# Patient Record
Sex: MALE | Race: WHITE | NOT HISPANIC OR LATINO | Employment: STUDENT | URBAN - METROPOLITAN AREA
[De-identification: names, ages, dates, MRNs, and addresses within clinical notes are randomized per-mention and may not be internally consistent; named-entity substitution may affect disease eponyms.]

---

## 2021-02-06 ENCOUNTER — OFFICE VISIT (OUTPATIENT)
Dept: URGENT CARE | Facility: CLINIC | Age: 21
End: 2021-02-06
Payer: COMMERCIAL

## 2021-02-06 VITALS — TEMPERATURE: 98.4 F | OXYGEN SATURATION: 99 % | HEART RATE: 111 BPM | RESPIRATION RATE: 16 BRPM

## 2021-02-06 DIAGNOSIS — Z11.59 SPECIAL SCREENING EXAMINATION FOR UNSPECIFIED VIRAL DISEASE: Primary | ICD-10-CM

## 2021-02-06 DIAGNOSIS — R50.9 FEVER, UNSPECIFIED FEVER CAUSE: ICD-10-CM

## 2021-02-06 DIAGNOSIS — B34.9 ACUTE VIRAL SYNDROME: ICD-10-CM

## 2021-02-06 PROCEDURE — 87635 SARS-COV-2 COVID-19 AMP PRB: CPT | Performed by: PHYSICIAN ASSISTANT

## 2021-02-06 PROCEDURE — G0382 LEV 3 HOSP TYPE B ED VISIT: HCPCS | Performed by: PHYSICIAN ASSISTANT

## 2021-02-06 NOTE — PROGRESS NOTES
[address] [facility]        NAME: Rolan Eden is a 24 y o  male  : 2000    MRN: [id number]  DATE: 2021  TIME: 4:49 AM    Assessment and Plan   Special screening examination for unspecified viral disease [Z11 59]  1  Special screening examination for unspecified viral disease  Novel Coronavirus (Covid-19),PCR [facility] - Office Collection   2  Fever, unspecified fever cause     3  Acute viral syndrome           Patient Instructions       Follow up with PCP in 3-5 days  Proceed to  ER if symptoms worsen  Chief Complaint     Chief Complaint   Patient presents with    Fever     Peak 102 0, "dry throat" (not painful to swallow)  Denies n/v/d, c/s, loss of taste or smell, loss of appetite, sob   s/s began 21  pt was positive for Covid - 19 20  Pt taking tylenol at home, last dose:  8pm 21         History of Present Illness         23-year-old male presents the clinic with fevers and dry throat that started 2 days ago  Pt is a student at [employer] and states that he moved in to his dorm room last week  Patient states that he has not been in any known contact with anyone suspected or known to have COVID-19  Patient did have positive COVID-19 test on 2020  Patient states that he was tested after he finishes quarantine and was then negative  Patient denies any nausea, vomiting, diarrhea, cough, chest tightness, shortness of breath, loss of taste or smell, loss of appetite, shortness breaths, fatigue, nasal congestion, rhinorrhea, sore throat  Patient states that he feels that his throat is dry and feels like he cannot get enough moisture even if he is drinking plenty of fluids  Patient states he has been drinking more than usual without any improvement in the dry throat  Patient denies any pain with swallowing, scratchy or irritated throat, difficulty speaking, eating or drinking    Patient states that this does not feel anything like his symptoms when he had COVID-19 in November  Patient states that since he is of student at [employer] he was sent to urgent care for a COVID test since they could not do a rapid test on campus today  Patient has been taking Tylenol for symptom relief and his last dose was yesterday at 8:00 p m  Jeremias Womack Patient states that his T-max yesterday was 80, patient denies any fevers today  Review of Systems   Review of Systems   Constitutional: Positive for fever  Negative for appetite change, chills and fatigue  HENT: Positive for sore throat (Dry throat, no pain)  Negative for congestion, ear pain, postnasal drip and rhinorrhea  Negative loss of sense of taste or smell   Eyes: Negative for photophobia, pain, discharge and visual disturbance  Respiratory: Negative for cough, chest tightness, shortness of breath and wheezing  Cardiovascular: Negative for chest pain and palpitations  Gastrointestinal: Negative for abdominal pain, diarrhea, nausea and vomiting  Musculoskeletal: Negative for myalgias  Skin: Negative for rash  Neurological: Negative for dizziness, light-headedness and headaches  Current Medications     No current outpatient medications on file  Current Allergies     Allergies as of 02/06/2021    (No Known Allergies)            The following portions of the patient's history were reviewed and updated as appropriate: allergies, current medications, past family history, past medical history, past social history, past surgical history and problem list      Past Medical History:   Diagnosis Date    COVID-19        Past Surgical History:   Procedure Laterality Date    ADENOIDECTOMY         History reviewed  No pertinent family history  Medications have been verified  Objective   Pulse (!) 111   Temp 98 4 °F (36 9 °C)   Resp 16   SpO2 99%   No LMP for male patient  Physical Exam     Physical Exam  Vitals signs and nursing note reviewed     Constitutional:       General: He is not in acute distress  Appearance: He is well-developed  He is not diaphoretic  HENT:      Head: Normocephalic and atraumatic  Nose: Nose normal       Mouth/Throat:      Lips: Pink  Mouth: Mucous membranes are moist       Pharynx: Oropharynx is clear  Uvula midline  No pharyngeal swelling, oropharyngeal exudate, posterior oropharyngeal erythema or uvula swelling  Comments:   No rapid strep was done since patient is denying any sore throat pain  Eyes:      Conjunctiva/sclera: Conjunctivae normal    Cardiovascular:      Rate and Rhythm: Normal rate and regular rhythm  Heart sounds: Normal heart sounds  Pulmonary:      Effort: Pulmonary effort is normal       Breath sounds: Normal breath sounds  Musculoskeletal: Normal range of motion  Skin:     General: Skin is warm and dry  Neurological:      Mental Status: He is alert and oriented to person, place, and time

## 2021-02-06 NOTE — PATIENT INSTRUCTIONS
Fever in Adults   AMBULATORY CARE:   A fever  is an increase in your body temperature  Normal body temperature is 98 6°F (37°C)  Fever is generally defined as greater than 100 4°F (38°C)  Common causes include an infection, injury, or disease such as arthritis  Other signs and symptoms may include any of the following:   · Chills and shivers     · Muscle stiffness    · Weight loss    · Night sweats    · Fever that comes and goes    · Fever that is higher in the morning    Seek care immediately if:   · Your fever does not go away or gets worse even after treatment  · You have a stiff neck and a bad headache  · You are confused  You may not be able to think clearly or remember things like you normally do  · Your heart beats faster than usual even after treatment  · You have shortness of breath or chest pain when you breathe  · You urinate small amounts or not at all  · Your skin, lips, or nails turn blue  Contact your healthcare provider if:   · You have abdominal pain or you feel bloated  · You have nausea or are vomiting  · You have pain or burning when you urinate, or you have pain in your back  · You have questions or concerns about your condition or care  Treatment for a fever  may include any of the following:  · NSAIDs , such as ibuprofen, help decrease swelling, pain, and fever  This medicine is available with or without a doctor's order  NSAIDs can cause stomach bleeding or kidney problems in certain people  If you take blood thinner medicine, always ask if NSAIDs are safe for you  Always read the medicine label and follow directions  Do not give these medicines to children under 10months of age without direction from your child's healthcare provider  · Acetaminophen  decreases pain and fever  It is available without a doctor's order  Ask how much to take and how often to take it  Follow directions   Read the labels of all other medicines you are using to see if they also contain acetaminophen, or ask your doctor or pharmacist  Acetaminophen can cause liver damage if not taken correctly  Do not use more than 4 grams (4,000 milligrams) total of acetaminophen in one day  · Antibiotics  may be given if you have an infection caused by bacteria  · Take your medicine as directed  Contact your healthcare provider if you think your medicine is not helping or if you have side effects  Tell him of her if you are allergic to any medicine  Keep a list of the medicines, vitamins, and herbs you take  Include the amounts, and when and why you take them  Bring the list or the pill bottles to follow-up visits  Carry your medicine list with you in case of an emergency  Self-care:   · Drink more liquids as directed  A fever makes you sweat  This can increase your risk for dehydration  Liquids can help prevent dehydration  ? Drink at least 6 to 8 eight-ounce cups of clear liquids each day  Drink water, juice, or broth  Do not drink sports drinks  They may contain caffeine  ? Ask your healthcare provider if you should drink an oral rehydration solution (ORS)  An ORS has the right amounts of water, salts, and sugar you need to replace body fluids  · Dress in lightweight clothes  Shivers may be a sign that your fever is rising  Do not put extra blankets or clothes on  This may cause your fever to rise even higher  Dress in light, comfortable clothing  Use a lightweight blanket or sheet when you sleep  Change your clothes, blanket, or sheets if they get wet  · Cool yourself safely  Take a bath in cool or lukewarm water  Use an ice pack wrapped in a small towel or wet a washcloth with cool water  Place the ice pack or wet washcloth on your forehead or the back of your neck  Follow up with your healthcare provider as directed:  Write down your questions so you remember to ask them during your visits     © Copyright [employer] 2020 Information is for End User's use only and may not be sold, redistributed or otherwise used for commercial purposes  All illustrations and images included in CareNotes® are the copyrighted property of A D A M , Inc  or Surinder Whittaker  The above information is an  only  It is not intended as medical advice for individual conditions or treatments  Talk to your doctor, nurse or pharmacist before following any medical regimen to see if it is safe and effective for you  Viral Syndrome, Ambulatory Care   GENERAL INFORMATION:   Viral syndrome  is a term healthcare providers use for general symptoms of a viral infection that has no clear cause  Common symptoms include the following:   · Fever and chills, or a rash    · Runny or stuffy nose     · Cough, sore throat, or hoarseness     · Headache, or pain and pressure around your eyes     · Muscle aches and joint pain     · Shortness of breath or wheezing     · Abdominal pain, cramps, and diarrhea     · Nausea, vomiting, or loss of appetite  Seek immediate care for the following symptoms:   · Continued vomiting and diarrhea    · Chest pain or trouble breathing  Treatment for a viral syndrome  may include medicines to decrease fever, cough, or stuffy nose  You may also need medicines to relieve a rash, itching, or help treat the viral infection  Manage your symptoms:  Drink liquids as directed to help prevent dehydration  Ask how much liquid to drink each day and which liquids are best for you  You may need to drink an oral rehydration solution (ORS)  An ORS has the right amounts of water, salts, and sugar you need to replace body fluids  Prevent the spread of viral syndrome:   · Wash your hands often  Use soap and water  Wash your hands after you use the bathroom, change a child's diapers, or sneeze  Wash your hands before you prepare or eat food  Use a gel hand  if soap and water are not available  · Wear a mask  to help prevent spreading the virus to others       · Cook and handle food properly  Cook food completely through  Clean food preparation surfaces with a disinfectant  · Ask about vaccinations  You may need an influenza (flu) vaccine, pneumococcal vaccine, or meningococcal vaccine  These vaccines help prevent the flu, pneumonia, and meningococcal disease  Follow up with your healthcare provider as directed:  Write down your questions so you remember to ask them during your visits  CARE AGREEMENT:   You have the right to help plan your care  Learn about your health condition and how it may be treated  Discuss treatment options with your caregivers to decide what care you want to receive  You always have the right to refuse treatment  The above information is an  only  It is not intended as medical advice for individual conditions or treatments  Talk to your doctor, nurse or pharmacist before following any medical regimen to see if it is safe and effective for you  © 2014 [address] is for End User's use only and may not be sold, redistributed or otherwise used for commercial purposes  All illustrations and images included in CareNotes® are the copyrighted property of A BHAVESH A [employer]ANT[employer] , Inc  or Pierce Villanueva

## 2021-02-07 LAB — SARS-COV-2 RNA RESP QL NAA+PROBE: NEGATIVE

## 2021-02-10 ENCOUNTER — OFFICE VISIT (OUTPATIENT)
Dept: URGENT CARE | Facility: CLINIC | Age: 21
End: 2021-02-10
Payer: COMMERCIAL

## 2021-02-10 VITALS
DIASTOLIC BLOOD PRESSURE: 76 MMHG | TEMPERATURE: 97.5 F | OXYGEN SATURATION: 95 % | BODY MASS INDEX: 25.73 KG/M2 | WEIGHT: 190 LBS | HEART RATE: 100 BPM | SYSTOLIC BLOOD PRESSURE: 134 MMHG | HEIGHT: 72 IN | RESPIRATION RATE: 18 BRPM

## 2021-02-10 DIAGNOSIS — R21 RASH: Primary | ICD-10-CM

## 2021-02-10 PROCEDURE — G0382 LEV 3 HOSP TYPE B ED VISIT: HCPCS | Performed by: PHYSICIAN ASSISTANT

## 2021-02-10 RX ORDER — PREDNISONE 10 MG/1
TABLET ORAL
Qty: 21 TABLET | Refills: 0 | Status: SHIPPED | OUTPATIENT
Start: 2021-02-10 | End: 2022-01-26

## 2021-02-10 NOTE — PROGRESS NOTES
[address] [facility]        NAME: Gina Maldonado is a 22 y o  male  : 2000    MRN: [id number]  DATE: February 10, 2021  TIME: 9:40 AM    Assessment and Plan   Rash [R21]  1  Rash  predniSONE 10 mg tablet         Patient Instructions     Take prednisone as directed  C/w topical cortisone as needed for itchiness  Monitor for worsening symptoms  Follow up with PCP in 3-5 days  Proceed to  ER if symptoms worsen  Chief Complaint     Chief Complaint   Patient presents with    Rash     Fine, flat red rash to back of BLE's and inner BUE's (constanza to inner forearms) that began as an itchy rash yesterday around noon  Took 2 Benadryl @ 7476 last PM          History of Present Illness       Patient presents with complaints of rash and dry mouth  Pt states that the dry mouth has been persisting for about a week since he had a virus (tested negative for COVID)  He notes that the rash began yesterday and describes it as itchy and red  He first noticed the rash on his legs and then on his arms  He denies rash of his torso  Pt reports applying cortisone and taking benadryl which gave him some relief of the rash  He denies any other symptoms including chills, sweats, chest tightness, cough  He reports having a fever several days ago but states that it has not persisted  Pt denies congestion and breathing through his mouth  He reports drinking water and gatorade without improvement in his dry mouth  Review of Systems   Review of Systems   Constitutional: Negative for chills and fever  HENT: Negative for ear pain and sore throat  Eyes: Negative for pain and visual disturbance  Respiratory: Negative for cough and shortness of breath  Cardiovascular: Negative for chest pain and palpitations  Gastrointestinal: Negative for abdominal pain and vomiting  Genitourinary: Negative for dysuria and hematuria  Musculoskeletal: Negative for arthralgias and back pain  Skin: Positive for rash   Negative for color change  Neurological: Negative for seizures and syncope  All other systems reviewed and are negative  Current Medications       Current Outpatient Medications:     predniSONE 10 mg tablet, Take 6 tabs on day 1, 5 tabs on day 2, 4 tabs on day 3, 3 tabs on day 4, 2 tabs on day 5, and 1 tab on day 6, Disp: 21 tablet, Rfl: 0    Current Allergies     Allergies as of 02/10/2021    (No Known Allergies)            The following portions of the patient's history were reviewed and updated as appropriate: allergies, current medications, past family history, past medical history, past social history, past surgical history and problem list      Past Medical History:   Diagnosis Date    COVID-19        Past Surgical History:   Procedure Laterality Date    ADENOIDECTOMY         No family history on file  Medications have been verified  Objective   /76   Pulse 100   Temp 97 5 °F (36 4 °C)   Resp 18   Ht 6' (1 829 m)   Wt 86 2 kg (190 lb)   SpO2 95%   BMI 25 77 kg/m²   No LMP for male patient  Physical Exam     Physical Exam  Vitals signs and nursing note reviewed  Constitutional:       General: He is not in acute distress  Appearance: Normal appearance  He is well-developed  He is not ill-appearing or diaphoretic  HENT:      Head: Normocephalic and atraumatic  Eyes:      Conjunctiva/sclera: Conjunctivae normal       Pupils: Pupils are equal, round, and reactive to light  Neck:      Musculoskeletal: Normal range of motion and neck supple  Cardiovascular:      Rate and Rhythm: Normal rate and regular rhythm  Heart sounds: Normal heart sounds  Pulmonary:      Effort: Pulmonary effort is normal  No respiratory distress  Breath sounds: Normal breath sounds  No stridor  No wheezing, rhonchi or rales  Lymphadenopathy:      Cervical: No cervical adenopathy  Skin:     General: Skin is warm and dry  Capillary Refill: Capillary refill takes less than 2 seconds  Findings: Rash (erythematous, slightly raised patches of upper and lower extremities; no induration, scaling, tenderness; blanchable) present  Neurological:      Mental Status: He is alert and oriented to person, place, and time  Cranial Nerves: No cranial nerve deficit  Sensory: No sensory deficit  Psychiatric:         Behavior: Behavior normal          Thought Content:  Thought content normal

## 2022-01-26 ENCOUNTER — OFFICE VISIT (OUTPATIENT)
Dept: CARDIOLOGY CLINIC | Facility: CLINIC | Age: 22
End: 2022-01-26
Payer: COMMERCIAL

## 2022-01-26 VITALS
HEART RATE: 84 BPM | DIASTOLIC BLOOD PRESSURE: 90 MMHG | OXYGEN SATURATION: 97 % | HEIGHT: 72 IN | BODY MASS INDEX: 26.41 KG/M2 | SYSTOLIC BLOOD PRESSURE: 140 MMHG | WEIGHT: 195 LBS

## 2022-01-26 DIAGNOSIS — I10 PRIMARY HYPERTENSION: Primary | ICD-10-CM

## 2022-01-26 DIAGNOSIS — R07.2 PRECORDIAL CHEST PAIN: ICD-10-CM

## 2022-01-26 PROBLEM — Z82.49 FAMILY HISTORY OF PREMATURE CAD: Status: ACTIVE | Noted: 2022-01-26

## 2022-01-26 PROCEDURE — 93000 ELECTROCARDIOGRAM COMPLETE: CPT | Performed by: INTERNAL MEDICINE

## 2022-01-26 PROCEDURE — 99205 OFFICE O/P NEW HI 60 MIN: CPT | Performed by: INTERNAL MEDICINE

## 2022-01-26 RX ORDER — AMLODIPINE BESYLATE 2.5 MG/1
TABLET ORAL
COMMUNITY
Start: 2021-12-02

## 2022-01-26 NOTE — PROGRESS NOTES
[facility] [address]  Shila Guzman [address] [address]  Phone#  [phone]  Fax#  [phone]  [facility] Office Consultation             NAME: Cyrena Merlin  AGE: 22 y o  SEX: male   : 2000   MRN: [id number]    DATE: 2022  TIME: 2:52 PM    Assessment and plan:    1  Primary hypertension  Assessment & Plan:  BP Readings from Last 3 Encounters:   22 140/90   02/10/21 134/76     Blood pressure marginally elevated today  However he reports that blood pressure is overall well controlled at home on the amlodipine  I have advised starting regular home home blood pressure monitoring  If systolic blood pressure remains above 140, amlodipine can be increased to 5 mg a day  Continue current medications  Lifestyle modification including increased physical activity, low-salt diet rich in fruits and vegetables, avoidance of alcohol intake and maintaining healthy weight  Orders:  -     POCT ECG  -     Echo complete w/ contrast if indicated; Future; Expected date: 2022    2  Precordial chest pain  Assessment & Plan:  Most likely noncardiac chest pain  No significant risk factors for coronary artery disease  No exertional chest pain  Recommend continuing regular exercise including walking and running and avoid heavy weightlifting  Echocardiogram planned to evaluate heart function and rule out significant valvular heart disease  Orders:  -     Echo complete w/ contrast if indicated; Future; Expected date: 2022         Chief Complaint   Patient presents with   [facility]    Hypertension       HPI:    Cyrena Merlin is a 22y o -year-old male who presents to the cardiology clinic for evaluation  He is a student at [employer]  He comes in for initial evaluation of elevated blood pressure and chest pain    He is originally from [place] and is currently studying biology and planning to pursue physical therapy higher education  He has a family history of hypertension  His father was diagnosed to have hypertension at a very young age as well  He is taking a low dose of amlodipine currently  He also reports some chest discomfort that is nonexertional     Chest pain is a dull ache  No radiation  No particular aggravating or relieving factors  No worsening with exertion  No change in functional capacity  He does work out at [employer] 5 days a week without worsening symptoms  No family history of sudden death  His father had a minor heart attack  in early 46s  No other family members have premature coronary artery disease  He does not know his cholesterol  He is a lifelong nonsmoker  Occasional alcohol use  He does not recall any workup for secondary hypertension including recent labs, sleep study or workup for any endocrinopathies/renal artery stenosis  We discussed pursuing this if hypertension remains labile  Hypertension management is with primary provider  Cardiology Problem list:  Hypertension, stage I:  PCP manages  Chest pain:  Noncardiac    Past history, family history, social history, current medications, vital signs, studies reviewed independently on this visit  BP Readings from Last 4 Encounters:   01/26/22 140/90   02/10/21 134/76      Wt Readings from Last 3 Encounters:   01/26/22 88 5 kg (195 lb)   02/10/21 86 2 kg (190 lb) (87 %, Z= 1 13)*     * Growth percentiles are based on CDC (Boys, 2-20 Years) data  No results found for: LDLCALC  No results found for: CREATININE       ALLERGIES:  No Known Allergies      Current Outpatient Medications:     amLODIPine (NORVASC) 2 5 mg tablet, , Disp: , Rfl:       Review of Systems   Constitutional: Positive for fatigue  Negative for fever and unexpected weight change  HENT: Negative for congestion and trouble swallowing  Eyes: Negative for visual disturbance  Respiratory: Positive for chest tightness   Negative for shortness of breath and wheezing  Cardiovascular: Positive for chest pain  Negative for palpitations and leg swelling  Gastrointestinal: Negative for abdominal pain and blood in stool  Endocrine: Negative for polyuria  Genitourinary: Negative for hematuria  Musculoskeletal: Negative for arthralgias and myalgias  Skin: Negative for rash  Neurological: Negative for dizziness, tremors and weakness  Hematological: Does not bruise/bleed easily  Psychiatric/Behavioral: Negative for sleep disturbance  Reports some stress at school       Past Medical History:   Diagnosis Date    COVID-19        Past Surgical History:   Procedure Laterality Date    ADENOIDECTOMY         History reviewed  No pertinent family history  Social History   reports that he has never smoked  He has never used smokeless tobacco  He reports current alcohol use  He reports that he does not use drugs  Only occasional marijuana use, during parties reported  Objective:     Vitals:    01/26/22 1424   BP: 140/90   Pulse: 84   SpO2: 97%     Wt Readings from Last 3 Encounters:   01/26/22 88 5 kg (195 lb)   02/10/21 86 2 kg (190 lb) (87 %, Z= 1 13)*     * Growth percentiles are based on CDC (Boys, 2-20 Years) data  Pulse Readings from Last 3 Encounters:   01/26/22 84   02/10/21 100   02/06/21 (!) 111     BP Readings from Last 3 Encounters:   01/26/22 140/90   02/10/21 134/76         Physical Exam  Constitutional:       General: He is not in acute distress  Appearance: Normal appearance  HENT:      Head: Normocephalic  Eyes:      General: No scleral icterus  Neck:      Vascular: No carotid bruit  Cardiovascular:      Rate and Rhythm: Normal rate and regular rhythm  Heart sounds: S1 normal and S2 normal  No murmur heard  Pulmonary:      Effort: Pulmonary effort is normal       Breath sounds: Normal breath sounds  Abdominal:      General: Bowel sounds are normal  There is no distension     Musculoskeletal:      Right lower leg: No edema  Left lower leg: No edema  Skin:     General: Skin is warm  Neurological:      General: No focal deficit present  Mental Status: He is alert  Psychiatric:         Mood and Affect: Mood normal          Pertinent Laboratory/Diagnostic Studies:    No results found  Cardiac testing:   EKG reviewed personally: normal EKG, mild sinus arrhythmia, normal sinus rhythm  Orders Placed This Encounter   Procedures    POCT ECG    Echo complete w/ contrast if indicated           Kings Jones MD, [facility] of the record may have been created with voice recognition software  Occasional wrong word or "sound a like" substitutions may have occurred due to the inherent limitations of voice recognition software  Read the chart carefully and recognize, using context, where substitutions have occurred  If you have any questions or concerns about the context, text or information contained within the body of this dictation, please contact myself, the provider, for further clarification

## 2022-01-26 NOTE — PATIENT INSTRUCTIONS
Blood pressure is marginally elevated today  Continue home blood pressure monitoring  If systolic blood pressure remains above 140, amlodipine can be increased to 5 mg a day  Following healthy lifestyle will help lower your blood pressure:   Increase physical activity  Low-salt diet rich in fruits and vegetables  Avoid alcohol intake  Maintain healthy weight  Do not smoke or use tobacco     Take medications as instructed  Practice meditation and stress reduction

## 2022-01-26 NOTE — ASSESSMENT & PLAN NOTE
Father had minor heart attack in 46s  May benefit from getting lipids checked with primary provider every 5 years  Discussed avoidance of smoking, recreational drugs and advised continuation of regular exercise and maintaining a healthy body weight   stress testing not indicated for now

## 2022-01-26 NOTE — ASSESSMENT & PLAN NOTE
BP Readings from Last 3 Encounters:   01/26/22 140/90   02/10/21 134/76     Blood pressure marginally elevated today  However he reports that blood pressure is overall well controlled at home on the amlodipine  I have advised starting regular home home blood pressure monitoring  If systolic blood pressure remains above 140, amlodipine can be increased to 5 mg a day  Continue current medications  Lifestyle modification including increased physical activity, low-salt diet rich in fruits and vegetables, avoidance of alcohol intake and maintaining healthy weight

## 2022-01-26 NOTE — ASSESSMENT & PLAN NOTE
Most likely noncardiac chest pain  Could be related to stress, or musculoskeletal or labile hypertension  No significant risk factors for coronary artery disease  No exertional chest pain  Recommend continuing regular exercise including walking and running and avoid heavy weightlifting  Echocardiogram planned to evaluate heart function, assess for LVH, that may warrant secondary hypertension workup and rule out significant valvular heart disease

## 2022-02-02 ENCOUNTER — HOSPITAL ENCOUNTER (OUTPATIENT)
Dept: NON INVASIVE DIAGNOSTICS | Facility: HOSPITAL | Age: 22
Discharge: HOME | End: 2022-02-02
Attending: INTERNAL MEDICINE
Payer: COMMERCIAL

## 2022-02-02 VITALS
WEIGHT: 195 LBS | HEART RATE: 104 BPM | SYSTOLIC BLOOD PRESSURE: 140 MMHG | DIASTOLIC BLOOD PRESSURE: 90 MMHG | BODY MASS INDEX: 26.41 KG/M2 | HEIGHT: 72 IN

## 2022-02-02 DIAGNOSIS — I10 PRIMARY HYPERTENSION: ICD-10-CM

## 2022-02-02 DIAGNOSIS — R07.2 PRECORDIAL CHEST PAIN: ICD-10-CM

## 2022-02-02 PROCEDURE — 93306 TTE W/DOPPLER COMPLETE: CPT

## 2022-02-02 PROCEDURE — 93356 MYOCRD STRAIN IMG SPCKL TRCK: CPT

## 2022-02-02 PROCEDURE — 93356 MYOCRD STRAIN IMG SPCKL TRCK: CPT | Performed by: INTERNAL MEDICINE

## 2022-02-02 PROCEDURE — 93306 TTE W/DOPPLER COMPLETE: CPT | Performed by: INTERNAL MEDICINE

## 2022-02-03 LAB
AORTIC ROOT: 3.3 CM
AORTIC VALVE MEAN VELOCITY: 8.8 M/S
APICAL FOUR CHAMBER EJECTION FRACTION: 60 %
ASCENDING AORTA: 3 CM (ref 2.09–3.13)
AV LVOT MEAN GRADIENT: 2 MMHG
AV LVOT PEAK GRADIENT: 5 MMHG
AV MEAN GRADIENT: 4 MMHG
AV PEAK GRADIENT: 7 MMHG
AV VELOCITY RATIO: 0.83
DOP CALC AO PEAK VEL: 1.33 M/S
DOP CALC AO VTI: 20.72 CM
DOP CALC LVOT PEAK VEL VTI: 19.06 CM
DOP CALC LVOT PEAK VEL: 1.1 M/S
E WAVE DECELERATION TIME: 221 MS
FRACTIONAL SHORTENING: 33 % (ref 28–44)
GLOBAL LONGITUIDAL STRAIN: -18 %
INTERVENTRICULAR SEPTUM IN DIASTOLE (PARASTERNAL SHORT AXIS VIEW): 1.1 CM (ref 0.55–1.02)
LAAS-AP2: 18.1 CM2
LAAS-AP4: 16.1 CM2
LEFT ATRIUM SIZE: 3.5 CM
LEFT INTERNAL DIMENSION IN SYSTOLE: 3.4 CM (ref 2.1–4)
LEFT VENTRICULAR INTERNAL DIMENSION IN DIASTOLE: 5.1 CM (ref 5.56–8.29)
LEFT VENTRICULAR POSTERIOR WALL IN END DIASTOLE: 0.7 CM (ref 0.53–1.01)
LEFT VENTRICULAR STROKE VOLUME: 77 ML
MV E'TISSUE VEL-LAT: 16 CM/S
MV E'TISSUE VEL-SEP: 13 CM/S
MV PEAK A VEL: 0.63 M/S
MV PEAK E VEL: 90 CM/S
MV STENOSIS PRESSURE HALF TIME: 0 MS
RIGHT VENTRICLE ID DIMENSION: 3.8 CM
SL CV LEFT ATRIUM LENGTH A2C: 4.8 CM
SL CV LV EF: 65
SL CV PED ECHO LEFT VENTRICLE DIASTOLIC VOLUME (MOD BIPLANE) 2D: 125 ML
SL CV PED ECHO LEFT VENTRICLE SYSTOLIC VOLUME (MOD BIPLANE) 2D: 48 ML
Z-SCORE OF ASCENDING AORTA: 1.48
Z-SCORE OF INTERVENTRICULAR SEPTUM IN END DIASTOLE: 2.59
Z-SCORE OF LEFT VENTRICULAR DIMENSION IN END SYSTOLE: -2.86
Z-SCORE OF LEFT VENTRICULAR POSTERIOR WALL IN END DIASTOLE: -0.59

## 2022-02-03 NOTE — RESULT ENCOUNTER NOTE
ECHO reviewed:   Normal pumping strength of the heart muscle  Valves: No serious abnormalities seen  The heart muscle thickness is normal   Overall these results are reassuring

## 2022-04-05 ENCOUNTER — TELEPHONE (OUTPATIENT)
Dept: CARDIOLOGY CLINIC | Facility: CLINIC | Age: 22
End: 2022-04-05

## 2022-04-05 NOTE — TELEPHONE ENCOUNTER
Spoke with pt offered one appt but he will be in class  He will be going back home soon and will follow up with a Cardiologist then

## 2022-11-30 ENCOUNTER — OFFICE VISIT (OUTPATIENT)
Dept: URGENT CARE | Facility: CLINIC | Age: 22
End: 2022-11-30

## 2022-11-30 VITALS
DIASTOLIC BLOOD PRESSURE: 90 MMHG | SYSTOLIC BLOOD PRESSURE: 143 MMHG | HEART RATE: 79 BPM | TEMPERATURE: 98.7 F | RESPIRATION RATE: 16 BRPM | OXYGEN SATURATION: 99 %

## 2022-11-30 DIAGNOSIS — H10.30 ACUTE BACTERIAL CONJUNCTIVITIS, UNSPECIFIED LATERALITY: Primary | ICD-10-CM

## 2022-11-30 RX ORDER — CIPROFLOXACIN HYDROCHLORIDE 3.5 MG/ML
1 SOLUTION/ DROPS TOPICAL
Qty: 5 ML | Refills: 0 | Status: SHIPPED | OUTPATIENT
Start: 2022-11-30

## 2022-11-30 NOTE — PROGRESS NOTES
[facility]        NAME: Danna Irene is a 25 y o  male  : 2000    MRN: [id number]  DATE: 2022  TIME: 2:19 PM    /90   Pulse 79   Temp 98 7 °F (37 1 °C)   Resp 16   SpO2 99%     Assessment and Plan   Acute bacterial conjunctivitis, unspecified laterality [H10 30]  1  Acute bacterial conjunctivitis, unspecified laterality  ciprofloxacin (CILOXAN) 0 3 % ophthalmic solution            Patient Instructions       Follow up with PCP in 3-5 days  Proceed to  ER if symptoms worsen  Chief Complaint     Chief Complaint   Patient presents with   • Conjunctivitis     Recurrent pink eye x 4 in 1 month  Pt used ofloxacin from an urgent care visit and otc medication  History of Present Illness       Pt with ongoing eye redness problem,  Was give ocuflox, problem resolved then came back several days later       Review of Systems   Review of Systems   Constitutional: Negative  HENT: Negative  Eyes: Positive for redness  Respiratory: Negative  Cardiovascular: Negative  Gastrointestinal: Negative  Endocrine: Negative  Genitourinary: Negative  Musculoskeletal: Negative  Skin: Negative  Allergic/Immunologic: Negative  Neurological: Negative  Hematological: Negative  Psychiatric/Behavioral: Negative  All other systems reviewed and are negative          Current Medications       Current Outpatient Medications:   •  ciprofloxacin (CILOXAN) 0 3 % ophthalmic solution, Administer 1 drop to both eyes every 2 (two) hours, Disp: 5 mL, Rfl: 0  •  amLODIPine (NORVASC) 2 5 mg tablet, , Disp: , Rfl:     Current Allergies     Allergies as of 2022   • (No Known Allergies)            The following portions of the patient's history were reviewed and updated as appropriate: allergies, current medications, past family history, past medical history, past social history, past surgical history and problem list      Past Medical History:   Diagnosis Date   • COVID-19    • Hypertension        Past Surgical History:   Procedure Laterality Date   • ADENOIDECTOMY         History reviewed  No pertinent family history  Medications have been verified  Objective   /90   Pulse 79   Temp 98 7 °F (37 1 °C)   Resp 16   SpO2 99%        Physical Exam     Physical Exam  Vitals and nursing note reviewed  Constitutional:       Appearance: Normal appearance  He is normal weight  Comments: Pt denies any vision changes   HENT:      Head: Normocephalic and atraumatic  Right Ear: Tympanic membrane, ear canal and external ear normal       Left Ear: Tympanic membrane, ear canal and external ear normal       Nose: Nose normal       Mouth/Throat:      Mouth: Mucous membranes are moist       Pharynx: Oropharynx is clear  Eyes:      Extraocular Movements: Extraocular movements intact  Pupils: Pupils are equal, round, and reactive to light  Comments: Conj injected bilat right more than left no d/c    Cardiovascular:      Rate and Rhythm: Normal rate and regular rhythm  Pulses: Normal pulses  Heart sounds: Normal heart sounds  Pulmonary:      Effort: Pulmonary effort is normal       Breath sounds: Normal breath sounds  Abdominal:      General: Abdomen is flat  Bowel sounds are normal       Palpations: Abdomen is soft  Musculoskeletal:         General: Normal range of motion  Cervical back: Normal range of motion and neck supple  Skin:     General: Skin is warm  Capillary Refill: Capillary refill takes less than 2 seconds  Neurological:      General: No focal deficit present  Mental Status: He is alert and oriented to person, place, and time     Psychiatric:         Mood and Affect: Mood normal          Behavior: Behavior normal